# Patient Record
Sex: FEMALE | Race: WHITE | NOT HISPANIC OR LATINO
[De-identification: names, ages, dates, MRNs, and addresses within clinical notes are randomized per-mention and may not be internally consistent; named-entity substitution may affect disease eponyms.]

---

## 2017-07-19 ENCOUNTER — APPOINTMENT (OUTPATIENT)
Dept: GASTROENTEROLOGY | Facility: CLINIC | Age: 67
End: 2017-07-19

## 2017-07-19 VITALS
HEIGHT: 64 IN | RESPIRATION RATE: 14 BRPM | BODY MASS INDEX: 23.22 KG/M2 | DIASTOLIC BLOOD PRESSURE: 70 MMHG | OXYGEN SATURATION: 98 % | HEART RATE: 79 BPM | TEMPERATURE: 98 F | WEIGHT: 136 LBS | SYSTOLIC BLOOD PRESSURE: 110 MMHG

## 2017-07-19 DIAGNOSIS — K63.5 POLYP OF COLON: ICD-10-CM

## 2017-07-19 DIAGNOSIS — Z80.0 FAMILY HISTORY OF MALIGNANT NEOPLASM OF DIGESTIVE ORGANS: ICD-10-CM

## 2017-07-19 DIAGNOSIS — Z86.010 PERSONAL HISTORY OF COLONIC POLYPS: ICD-10-CM

## 2017-07-19 RX ORDER — LEVOTHYROXINE SODIUM 0.09 MG/1
88 TABLET ORAL
Refills: 0 | Status: ACTIVE | COMMUNITY

## 2017-07-19 RX ORDER — ATORVASTATIN CALCIUM 10 MG/1
10 TABLET, FILM COATED ORAL
Refills: 0 | Status: ACTIVE | COMMUNITY

## 2018-07-17 ENCOUNTER — OTHER (OUTPATIENT)
Age: 68
End: 2018-07-17

## 2018-09-20 ENCOUNTER — OUTPATIENT (OUTPATIENT)
Dept: OUTPATIENT SERVICES | Facility: HOSPITAL | Age: 68
LOS: 1 days | Discharge: HOME | End: 2018-09-20
Payer: COMMERCIAL

## 2018-09-20 ENCOUNTER — APPOINTMENT (OUTPATIENT)
Dept: GASTROENTEROLOGY | Facility: HOSPITAL | Age: 68
End: 2018-09-20

## 2018-09-20 ENCOUNTER — RESULT REVIEW (OUTPATIENT)
Age: 68
End: 2018-09-20

## 2018-09-20 DIAGNOSIS — Z12.11 ENCOUNTER FOR SCREENING FOR MALIGNANT NEOPLASM OF COLON: ICD-10-CM

## 2018-09-20 PROCEDURE — 45380 COLONOSCOPY AND BIOPSY: CPT | Mod: GC

## 2018-09-20 PROCEDURE — 88305 TISSUE EXAM BY PATHOLOGIST: CPT | Mod: 26

## 2018-09-23 LAB — SURGICAL PATHOLOGY STUDY: SIGNIFICANT CHANGE UP

## 2021-08-17 ENCOUNTER — APPOINTMENT (OUTPATIENT)
Dept: CARDIOLOGY | Facility: CLINIC | Age: 71
End: 2021-08-17
Payer: COMMERCIAL

## 2021-08-17 VITALS
HEART RATE: 67 BPM | TEMPERATURE: 98 F | BODY MASS INDEX: 23.52 KG/M2 | SYSTOLIC BLOOD PRESSURE: 128 MMHG | DIASTOLIC BLOOD PRESSURE: 70 MMHG | WEIGHT: 137 LBS

## 2021-08-17 DIAGNOSIS — Z87.898 PERSONAL HISTORY OF OTHER SPECIFIED CONDITIONS: ICD-10-CM

## 2021-08-17 DIAGNOSIS — E03.9 HYPOTHYROIDISM, UNSPECIFIED: ICD-10-CM

## 2021-08-17 PROCEDURE — 99204 OFFICE O/P NEW MOD 45 MIN: CPT

## 2021-08-17 RX ORDER — POLYETHYLENE GLYCOL 3350, SODIUM SULFATE, SODIUM CHLORIDE, POTASSIUM CHLORIDE, ASCORBIC ACID, SODIUM ASCORBATE 7.5-2.691G
100 KIT ORAL
Qty: 1 | Refills: 0 | Status: DISCONTINUED | COMMUNITY
Start: 2018-07-17 | End: 2021-08-17

## 2021-08-17 NOTE — PHYSICAL EXAM
[Well Developed] : well developed [Well Nourished] : well nourished [Normal Conjunctiva] : normal conjunctiva [No Carotid Bruit] : no carotid bruit [Normal S1, S2] : normal S1, S2 [No Murmur] : no murmur [Clear Lung Fields] : clear lung fields [No Respiratory Distress] : no respiratory distress  [Soft] : abdomen soft [Non Tender] : non-tender [No Edema] : no edema [Normal DP B/L] : normal DP B/L [de-identified] : Anxious but no acute distress

## 2021-08-17 NOTE — DISCUSSION/SUMMARY
[FreeTextEntry1] : 1.  I reassured her that I felt her cardiac status was stable.\par \par 2.  If the fluttering occurs more frequently then a further cardiac work-up perhaps with an echocardiogram and long-term Holter monitor perhaps 15 or 30 days should be considered\par \par However presently she is asymptomatic with reasonable exercise tolerance and rare intermittent palpitations and fluttering\par \par Follow-up with me again if the symptoms exacerbate

## 2021-08-17 NOTE — ASSESSMENT
[FreeTextEntry1] : Maria Guadalupe is an overall healthy 71-year-old with some intermittent fluttering in her chest fairly infrequent and also symptoms of skipped heartbeats probably APCs or PVCs again not that frequent.  She has a normal physical exam, normal blood pressure and normal EKG.\par \par Overall I find her cardiac status stable at the present time.

## 2021-08-17 NOTE — HISTORY OF PRESENT ILLNESS
[FreeTextEntry1] : Maria Guadalupe is an overall healthy 71-year-old with a history of hyperlipidemia on Lipitor 10 and a history of hypothyroidism and thyroid nodules on levothyroxine 88 mcg.\par \par She was a previous smoker stopping many years ago\par \par She has had previous stress tests perhaps 15 or 20 years ago which were unremarkable\par \par She does have a history of fluttering in her chest and missed heartbeats.  She still works full-time and feels tired and exhausted at the end of the day.  She does exercise on a fairly regular basis perhaps 3 times a week for 30 minutes but feels short of breath and exhausted and sweaty during the exercise.  She has no chest pain or palpitations or fluttering\par \par She describes intermittent fluttering which lasts for a few seconds but it could occur on a monthly basis or even less than that.  She also complains of missed heartbeats which sound like either APCs or PVCs.  She has no associated chest pain\par \par EKG reveals normal sinus rhythm and is within normal limits

## 2021-08-17 NOTE — REASON FOR VISIT
[FreeTextEntry1] : Maria Guadalupe Kiana  71 years old here for evaluation of fluttering in her chest

## 2023-04-05 ENCOUNTER — APPOINTMENT (OUTPATIENT)
Dept: GASTROENTEROLOGY | Facility: CLINIC | Age: 73
End: 2023-04-05
Payer: COMMERCIAL

## 2023-04-05 VITALS
HEART RATE: 68 BPM | HEIGHT: 64 IN | OXYGEN SATURATION: 98 % | WEIGHT: 136 LBS | TEMPERATURE: 98.6 F | BODY MASS INDEX: 23.22 KG/M2 | DIASTOLIC BLOOD PRESSURE: 80 MMHG | SYSTOLIC BLOOD PRESSURE: 130 MMHG

## 2023-04-05 DIAGNOSIS — Z12.11 ENCOUNTER FOR SCREENING FOR MALIGNANT NEOPLASM OF COLON: ICD-10-CM

## 2023-04-05 PROCEDURE — 99203 OFFICE O/P NEW LOW 30 MIN: CPT

## 2023-04-05 RX ORDER — PEG-3350, SODIUM SULFATE, SODIUM CHLORIDE, POTASSIUM CHLORIDE, SODIUM ASCORBATE AND ASCORBIC ACID 7.5-2.691G
100 KIT ORAL
Qty: 1 | Refills: 0 | Status: ACTIVE | COMMUNITY
Start: 2023-04-05 | End: 1900-01-01

## 2023-04-05 NOTE — REVIEW OF SYSTEMS
[As Noted in HPI] : as noted in HPI [Urinary Difficulties] : urinary difficulties [Negative] : Heme/Lymph [FreeTextEntry8] : Dysuria and urinary frequency reported today.

## 2023-04-05 NOTE — CONSULT LETTER
[Dear  ___] : Dear  [unfilled], [Consult Letter:] : I had the pleasure of evaluating your patient, [unfilled]. [Please see my note below.] : Please see my note below. [Consult Closing:] : Thank you very much for allowing me to participate in the care of this patient.  If you have any questions, please do not hesitate to contact me. [Sincerely,] : Sincerely, [FreeTextEntry2] : Dr. Mirela Paredes [FreeTextEntry3] : Alexsander Freedman M.D.

## 2023-04-05 NOTE — PHYSICAL EXAM
[Alert] : alert [Normal Voice/Communication] : normal voice/communication [Healthy Appearing] : healthy appearing [No Acute Distress] : no acute distress [Sclera] : the sclera and conjunctiva were normal [Normal Appearance] : the appearance of the neck was normal [No Neck Mass] : no neck mass was observed [No Respiratory Distress] : no respiratory distress [No Acc Muscle Use] : no accessory muscle use [Respiration, Rhythm And Depth] : normal respiratory rhythm and effort [Auscultation Breath Sounds / Voice Sounds] : lungs were clear to auscultation bilaterally [Heart Rate And Rhythm] : heart rate was normal and rhythm regular [Normal S1, S2] : normal S1 and S2 [Murmurs] : no murmurs [Bowel Sounds] : normal bowel sounds [Abdomen Tenderness] : non-tender [No Masses] : no abdominal mass palpated [Abdomen Soft] : soft [] : no hepatosplenomegaly [Cervical Lymph Nodes Enlarged Posterior Bilaterally] : no posterior cervical lymphadenopathy [Supraclavicular Lymph Nodes Enlarged Bilaterally] : no supraclavicular lymphadenopathy [Cervical Lymph Nodes Enlarged Anterior Bilaterally] : no anterior cervical lymphadenopathy [Abnormal Walk] : normal gait [No Clubbing, Cyanosis] : no clubbing or cyanosis of the fingernails [Normal Color / Pigmentation] : normal skin color and pigmentation [Oriented To Time, Place, And Person] : oriented to person, place, and time [Normal Affect] : the affect was normal [Normal Insight/Judgment] : insight and judgment were intact [Normal Mood] : the mood was normal [Remote Memory Normal] : remote memory was not impaired [de-identified] : Healed suprapubic transverse scar.

## 2023-04-05 NOTE — ASSESSMENT
[FreeTextEntry1] : Impression:\par \par #1 history of colonic polyps including adenomatous polyp and sessile serrated lesion-status post removal of a 4 mm diminutive hepatic flexure adenoma at the 10/10/13 colonoscopy.  Colonoscopy on 9/20/2018 noted for a 5 mm sessile serrated lesion at the appendiceal orifice.  The patient is currently asymptomatic from a GI standpoint.  Rule out metachronous lesions.\par \par #2 diverticulosis.\par \par #3 hypothyroidism.\par \par #4 hypercholesterolemia.\par \par #5 status post appendectomy.\par \par #6 status post ovarian cystectomy.\par \par #7 UTI.

## 2023-04-05 NOTE — HISTORY OF PRESENT ILLNESS
[FreeTextEntry1] : Office consultation on 4/5/2023.\par \par The patient is a 72-year-old woman evaluated for consultation in preparation for a surveillance colonoscopy. PMD: Dr. Mirela Paredes.\par \par The patient has one formed bowel movement daily to every other day without any complaints of diarrhea, constipation, change in bowel habit or rectal bleeding.\par \par Reports stable appetite and weight.  Denies any complaints of dysphagia, heartburn, nausea, vomiting or abdominal pain.\par \par Colonoscopy on 4/3/03 was noted for removal of a 5 mm proximal sigmoid colon polyp.\par \par Colonoscopy on 5/9/08 was noted for removal of 3 diminutive sigmoid hyperplastic polyps.\par \par Colonoscopy on 10/10/13 was noted for removal of a 4 mm diminutive hepatic flexure adenoma. A few sigmoid diverticula were noted.\par \par Colonoscopy on 9/20/2018 was noted for a 5 mm polyp within the appendiceal orifice revealing a sessile serrated lesion.  Patient status post appendectomy.  Proximal ascending colon diverticulum as well as few sigmoid diverticula detected.  Colon redundant and tortuous.\par \par Patient reports no history of digestive illness. Denies family history of colon cancer.

## 2023-05-18 ENCOUNTER — APPOINTMENT (OUTPATIENT)
Dept: GASTROENTEROLOGY | Facility: HOSPITAL | Age: 73
End: 2023-05-18

## 2023-05-18 ENCOUNTER — OUTPATIENT (OUTPATIENT)
Dept: OUTPATIENT SERVICES | Facility: HOSPITAL | Age: 73
LOS: 1 days | Discharge: HOME | End: 2023-05-18
Payer: COMMERCIAL

## 2023-05-18 VITALS
WEIGHT: 130.95 LBS | HEIGHT: 64 IN | OXYGEN SATURATION: 99 % | HEART RATE: 74 BPM | RESPIRATION RATE: 12 BRPM | DIASTOLIC BLOOD PRESSURE: 65 MMHG | SYSTOLIC BLOOD PRESSURE: 139 MMHG | TEMPERATURE: 97 F

## 2023-05-18 VITALS
DIASTOLIC BLOOD PRESSURE: 75 MMHG | HEART RATE: 72 BPM | RESPIRATION RATE: 16 BRPM | SYSTOLIC BLOOD PRESSURE: 143 MMHG | OXYGEN SATURATION: 97 %

## 2023-05-18 DIAGNOSIS — Z12.11 ENCOUNTER FOR SCREENING FOR MALIGNANT NEOPLASM OF COLON: ICD-10-CM

## 2023-05-18 PROCEDURE — 45378 DIAGNOSTIC COLONOSCOPY: CPT

## 2023-05-18 NOTE — ASU PATIENT PROFILE, ADULT - URINARY CATHETER
LVM for patient. Spoke with [employer] regarding the denial of Sayra Love by her insurance. [employer] would like to know if she would like to start taking Metformin.  Waiting for c/b no
